# Patient Record
Sex: FEMALE | Race: WHITE | NOT HISPANIC OR LATINO | URBAN - METROPOLITAN AREA
[De-identification: names, ages, dates, MRNs, and addresses within clinical notes are randomized per-mention and may not be internally consistent; named-entity substitution may affect disease eponyms.]

---

## 2024-02-07 ENCOUNTER — APPOINTMENT (OUTPATIENT)
Dept: RADIOLOGY | Facility: CLINIC | Age: 49
End: 2024-02-07
Payer: COMMERCIAL

## 2024-02-07 ENCOUNTER — OFFICE VISIT (OUTPATIENT)
Age: 49
End: 2024-02-07
Payer: COMMERCIAL

## 2024-02-07 VITALS
HEIGHT: 62 IN | RESPIRATION RATE: 16 BRPM | SYSTOLIC BLOOD PRESSURE: 134 MMHG | BODY MASS INDEX: 27.05 KG/M2 | WEIGHT: 147 LBS | DIASTOLIC BLOOD PRESSURE: 87 MMHG

## 2024-02-07 DIAGNOSIS — M20.22 HALLUX RIGIDUS OF BOTH FEET: Primary | ICD-10-CM

## 2024-02-07 DIAGNOSIS — M21.41 ACQUIRED FLAT FOOT, RIGHT: ICD-10-CM

## 2024-02-07 DIAGNOSIS — M25.571 ARTHRALGIA OF RIGHT FOOT: ICD-10-CM

## 2024-02-07 DIAGNOSIS — M21.42 ACQUIRED FLAT FOOT, LEFT: ICD-10-CM

## 2024-02-07 DIAGNOSIS — M20.21 HALLUX RIGIDUS OF BOTH FEET: Primary | ICD-10-CM

## 2024-02-07 PROCEDURE — 73630 X-RAY EXAM OF FOOT: CPT

## 2024-02-07 PROCEDURE — 99203 OFFICE O/P NEW LOW 30 MIN: CPT | Performed by: PODIATRIST

## 2024-02-07 RX ORDER — SUMATRIPTAN 50 MG/1
TABLET, FILM COATED ORAL
COMMUNITY
Start: 2018-02-04

## 2024-02-07 RX ORDER — MESALAMINE 1.2 G/1
TABLET, DELAYED RELEASE ORAL
COMMUNITY
Start: 2020-03-04

## 2024-02-07 RX ORDER — SENNOSIDES 8.6 MG
CAPSULE ORAL
COMMUNITY
Start: 2023-12-21

## 2024-02-07 NOTE — PROGRESS NOTES
Assessment/Plan: Pain upon ambulation secondary to hallux rigidus right greater than left.  Acquired deformity foot.  Acquired pes planus.  Arthralgia right first MPJ.    Plan.  Chart reviewed.  Patient examined.  Patient advised on condition.  At this time we recommend arthrocentesis.  Patient will consider.  Patient would benefit from orthotic foot control.  If needed been casted for these.  These will control pronation and ease pain.  Patient is interested in surgical intervention, therefore x-rays ordered.  Patient return for evaluation and possible surgery scheduling.  Patient will take Advil or Aleve as needed.  Aftercare instruction given.  Return for follow-up.         Diagnoses and all orders for this visit:    Hallux rigidus of both feet  -     Device Prior Authorization; Future    Acquired flat foot, right  -     Device Prior Authorization; Future    Acquired flat foot, left  -     Device Prior Authorization; Future    Arthralgia of right foot  -     X-ray foot right 3+ views; Future    Other orders  -     SUMAtriptan (IMITREX) 50 mg tablet  -     mesalamine (LIALDA) 1.2 g EC tablet  -     acetaminophen (Tylenol 8 Hour Arthritis Pain) 650 mg CR tablet          Subjective: Patient has pain.  She has chronic pain in the right big toe joint.  This has been ongoing for years.  No history of trauma    No Known Allergies      Current Outpatient Medications:     acetaminophen (Tylenol 8 Hour Arthritis Pain) 650 mg CR tablet, , Disp: , Rfl:     mesalamine (LIALDA) 1.2 g EC tablet, , Disp: , Rfl:     SUMAtriptan (IMITREX) 50 mg tablet, , Disp: , Rfl:     There is no problem list on file for this patient.         Patient ID: Heydi Villela is a 48 y.o. female.    HPI    The following portions of the patient's history were reviewed and updated as appropriate:     family history includes Cancer in her mother and paternal grandmother; Diabetes in her father; Heart disease in her father and paternal grandfather;  Hypertension in her mother; Ulcerative colitis in her sister.      reports that she has never smoked. She has never used smokeless tobacco. She reports current alcohol use of about 2.0 standard drinks of alcohol per week. She reports that she does not use drugs.    Vitals:    02/07/24 1314   BP: 134/87   Resp: 16       Review of Systems      Objective:  Patient's shoes and socks removed.   Foot Exam    General  General Appearance: appears stated age and healthy   Orientation: alert and oriented to person, place, and time   Affect: appropriate       Right Foot/Ankle     Inspection and Palpation  Tenderness: great toe metatarsophalangeal joint   Swelling: great toe metatarsophalangeal joint   Arch: pes planus  Hallux limitus: yes    Neurovascular  Dorsalis pedis: 3+  Posterior tibial: 3+      Left Foot/Ankle      Inspection and Palpation  Arch: pes planus  Hallux limitus: yes    Neurovascular  Dorsalis pedis: 3+  Posterior tibial: 3+      Physical Exam  Vitals and nursing note reviewed.   Constitutional:       Appearance: Normal appearance.   Cardiovascular:      Rate and Rhythm: Normal rate and regular rhythm.      Pulses:           Dorsalis pedis pulses are 3+ on the right side and 3+ on the left side.        Posterior tibial pulses are 3+ on the right side and 3+ on the left side.   Feet:      Comments: Patient is pronated in stance and gait.  Right foot demonstrates significant dorsal spurring.  Decreased range of motion of right first MPJ noted.  Mild crepitus noted.  Skin:     Capillary Refill: Capillary refill takes less than 2 seconds.   Neurological:      Mental Status: She is alert.   Psychiatric:         Mood and Affect: Mood normal.         Behavior: Behavior normal.         Thought Content: Thought content normal.         Judgment: Judgment normal.

## 2024-02-08 ENCOUNTER — TELEPHONE (OUTPATIENT)
Age: 49
End: 2024-02-08

## 2024-02-08 NOTE — TELEPHONE ENCOUNTER
Lmom for patient per dr de la cruz test results are    Please advise patient as expected there is arthritis of the big toe joint

## 2024-02-08 NOTE — TELEPHONE ENCOUNTER
----- Message from Mauri Klein DPM sent at 2/7/2024  5:14 PM EST -----  Please advise patient as expected there is arthritis of the big toe joint  ----- Message -----  From: Samaria, Radiology Results In  Sent: 2/7/2024   4:24 PM EST  To: Mauri Klein DPM

## 2024-02-14 ENCOUNTER — TELEPHONE (OUTPATIENT)
Age: 49
End: 2024-02-14

## 2024-02-14 NOTE — TELEPHONE ENCOUNTER
LMOM for patient to callback to go over information from her insurance company about her orthotics the info is in the referral entry.

## 2024-02-16 ENCOUNTER — TELEPHONE (OUTPATIENT)
Age: 49
End: 2024-02-16

## 2024-02-16 NOTE — TELEPHONE ENCOUNTER
Caller: Heydi     Doctor/Office: Ernie/urvashi     CB#: [phone]    Escalation: Care Please call pt back about orthotics self pay vs ins

## 2024-02-19 NOTE — TELEPHONE ENCOUNTER
Went over information with her. She stated that she typically doesn't have a deduct so I am going to call her insur again tomorrow to make sure.

## 2024-02-20 ENCOUNTER — TELEPHONE (OUTPATIENT)
Age: 49
End: 2024-02-20

## 2024-02-20 NOTE — TELEPHONE ENCOUNTER
Caller: Heydi Villela    Doctor: Ernie Villatoro    Reason for call: Heydi is returning office call to her regarding orthotics.  Can someone please reach back out to her?  Thank you.    Call back#: [phone]

## 2024-03-05 ENCOUNTER — OFFICE VISIT (OUTPATIENT)
Age: 49
End: 2024-03-05
Payer: COMMERCIAL

## 2024-03-05 VITALS
BODY MASS INDEX: 27.05 KG/M2 | WEIGHT: 147 LBS | SYSTOLIC BLOOD PRESSURE: 110 MMHG | HEIGHT: 62 IN | HEART RATE: 88 BPM | DIASTOLIC BLOOD PRESSURE: 75 MMHG | RESPIRATION RATE: 17 BRPM

## 2024-03-05 DIAGNOSIS — M21.42 ACQUIRED FLAT FOOT, LEFT: ICD-10-CM

## 2024-03-05 DIAGNOSIS — M20.21 HALLUX RIGIDUS OF RIGHT FOOT: ICD-10-CM

## 2024-03-05 DIAGNOSIS — M21.41 ACQUIRED FLAT FOOT, RIGHT: ICD-10-CM

## 2024-03-05 DIAGNOSIS — M25.571 ARTHRALGIA OF RIGHT FOOT: Primary | ICD-10-CM

## 2024-03-05 PROCEDURE — 20600 DRAIN/INJ JOINT/BURSA W/O US: CPT | Performed by: PODIATRIST

## 2024-03-05 PROCEDURE — 99212 OFFICE O/P EST SF 10 MIN: CPT | Performed by: PODIATRIST

## 2024-03-05 RX ORDER — MELOXICAM 7.5 MG/1
7.5 TABLET ORAL DAILY
Qty: 10 TABLET | Refills: 0 | Status: SHIPPED | OUTPATIENT
Start: 2024-03-05 | End: 2024-03-15

## 2024-03-05 NOTE — PROGRESS NOTES
"Assessment/Plan: Hallux rigidus right greater than left.  Acquired deformity foot.  Acquired pes planus.  Pain upon ambulation.    Plan.  Chart reviewed.  PCP notes reviewed.  Patient examined.  Patient advised on condition.  Today arthrocentesis done right first MPJ.  Patient be placed on Mobic.  She will use orthotics as directed.  Patient advised on the possibility of operative intervention for the right foot.  We will consider cheilectomy.  Aftercare instruction given.  Return as needed.     Small joint arthrocentesis: R great MTP  Universal Protocol:  Consent: Verbal consent obtained.  Risks and benefits: risks, benefits and alternatives were discussed  Consent given by: patient  Time out: Immediately prior to procedure a \"time out\" was called to verify the correct patient, procedure, equipment, support staff and site/side marked as required.  Timeout called at: 3/5/2024 3:07 PM.  Patient understanding: patient states understanding of the procedure being performed  Patient identity confirmed: verbally with patient  Supporting Documentation  Indications: pain and joint swelling   Procedure Details  Location: great toe - R great MTP  Preparation: Patient was prepped and draped in the usual sterile fashion  Needle size: 25 G  Ultrasound guidance: no  Approach: dorsal  Medications administered: 10 mg triamcinolone acetonide 10 mg/mL    Patient tolerance: patient tolerated the procedure well with no immediate complications                 Diagnoses and all orders for this visit:    Arthralgia of right foot  -     meloxicam (MOBIC) 7.5 mg tablet; Take 1 tablet (7.5 mg total) by mouth daily for 10 days    Acquired flat foot, right  -     meloxicam (MOBIC) 7.5 mg tablet; Take 1 tablet (7.5 mg total) by mouth daily for 10 days    Acquired flat foot, left  -     meloxicam (MOBIC) 7.5 mg tablet; Take 1 tablet (7.5 mg total) by mouth daily for 10 days    Hallux rigidus of right foot  -     meloxicam (MOBIC) 7.5 mg tablet; " Take 1 tablet (7.5 mg total) by mouth daily for 10 days          Subjective: Has return of pain.  She has pain mostly in her right big toe joint.  No history of trauma.    No Known Allergies      Current Outpatient Medications:     acetaminophen (Tylenol 8 Hour Arthritis Pain) 650 mg CR tablet, , Disp: , Rfl:     meloxicam (MOBIC) 7.5 mg tablet, Take 1 tablet (7.5 mg total) by mouth daily for 10 days, Disp: 10 tablet, Rfl: 0    mesalamine (LIALDA) 1.2 g EC tablet, , Disp: , Rfl:     SUMAtriptan (IMITREX) 50 mg tablet, , Disp: , Rfl:     There is no problem list on file for this patient.         Patient ID: Heydi Villela is a 48 y.o. female.    HPI    The following portions of the patient's history were reviewed and updated as appropriate:     family history includes Cancer in her mother and paternal grandmother; Diabetes in her father; Heart disease in her father and paternal grandfather; Hypertension in her mother; Ulcerative colitis in her sister.      reports that she has never smoked. She has never used smokeless tobacco. She reports current alcohol use of about 2.0 standard drinks of alcohol per week. She reports that she does not use drugs.    Vitals:    03/05/24 1455   BP: 110/75   Pulse: 88   Resp: 17       Review of Systems      Objective:  Patient's shoes and socks removed.   Foot ExamPhysical Exam         Foot Exam     General  General Appearance: appears stated age and healthy   Orientation: alert and oriented to person, place, and time   Affect: appropriate         Right Foot/Ankle      Inspection and Palpation  Tenderness: great toe metatarsophalangeal joint   Swelling: great toe metatarsophalangeal joint   Arch: pes planus  Hallux limitus: yes     Neurovascular  Dorsalis pedis: 3+  Posterior tibial: 3+        Left Foot/Ankle       Inspection and Palpation  Arch: pes planus  Hallux limitus: yes     Neurovascular  Dorsalis pedis: 3+  Posterior tibial: 3+        Physical Exam  Vitals and nursing note  reviewed.   Constitutional:       Appearance: Normal appearance.   Cardiovascular:      Rate and Rhythm: Normal rate and regular rhythm.      Pulses:           Dorsalis pedis pulses are 3+ on the right side and 3+ on the left side.        Posterior tibial pulses are 3+ on the right side and 3+ on the left side.   Feet:      Comments: Patient is pronated in stance and gait.  Right foot demonstrates significant dorsal spurring.  Decreased range of motion of right first MPJ noted.  Mild crepitus noted.  Skin:     Capillary Refill: Capillary refill takes less than 2 seconds.   Neurological:      Mental Status: She is alert.   Psychiatric:         Mood and Affect: Mood normal.         Behavior: Behavior normal.         Thought Content: Thought content normal.         Judgment: Judgment normal.

## 2024-03-26 ENCOUNTER — OFFICE VISIT (OUTPATIENT)
Age: 49
End: 2024-03-26
Payer: COMMERCIAL

## 2024-03-26 DIAGNOSIS — M21.41 ACQUIRED FLAT FOOT, RIGHT: ICD-10-CM

## 2024-03-26 DIAGNOSIS — M21.42 ACQUIRED FLAT FOOT, LEFT: ICD-10-CM

## 2024-03-26 DIAGNOSIS — M20.22 HALLUX RIGIDUS OF BOTH FEET: Primary | ICD-10-CM

## 2024-03-26 DIAGNOSIS — M20.21 HALLUX RIGIDUS OF BOTH FEET: Primary | ICD-10-CM

## 2024-03-26 PROCEDURE — L3000 FT INSERT UCB BERKELEY SHELL: HCPCS | Performed by: PODIATRIST

## 2024-03-26 NOTE — PROGRESS NOTES
Assessment/Plan: Arthralgia secondary to hallux rigidus deformity bilateral.  Acquired deformity foot bilateral.  Acquired pes planus.  Pain.    Plan.  Chart reviewed.  Patient presents for orthotic pickup.  Today patient was dispensed orthotics.  She will use these daily to control deformity and ease pain.         Diagnoses and all orders for this visit:    Hallux rigidus of both feet    Acquired flat foot, right    Acquired flat foot, left          Subjective: Patient has pain upon ambulation.    No Known Allergies      Current Outpatient Medications:     acetaminophen (Tylenol 8 Hour Arthritis Pain) 650 mg CR tablet, , Disp: , Rfl:     meloxicam (MOBIC) 7.5 mg tablet, Take 1 tablet (7.5 mg total) by mouth daily for 10 days, Disp: 10 tablet, Rfl: 0    mesalamine (LIALDA) 1.2 g EC tablet, , Disp: , Rfl:     SUMAtriptan (IMITREX) 50 mg tablet, , Disp: , Rfl:     Current Facility-Administered Medications:     triamcinolone acetonide (KENALOG-10) 10 mg/mL injection 10 mg, 10 mg, Intra-articular, , , 10 mg at 03/05/24 9875    There is no problem list on file for this patient.         Patient ID: Heydi Villela is a 48 y.o. female.    HPI    The following portions of the patient's history were reviewed and updated as appropriate:     family history includes Cancer in her mother and paternal grandmother; Diabetes in her father; Heart disease in her father and paternal grandfather; Hypertension in her mother; Ulcerative colitis in her sister.      reports that she has never smoked. She has never used smokeless tobacco. She reports current alcohol use of about 2.0 standard drinks of alcohol per week. She reports that she does not use drugs.    There were no vitals filed for this visit.    Review of Systems      Objective:  Patient's shoes and socks removed.   Foot ExamPhysical Exam         Foot Exam     General  General Appearance: appears stated age and healthy   Orientation: alert and oriented to person, place, and time    Affect: appropriate         Right Foot/Ankle      Inspection and Palpation  Tenderness: great toe metatarsophalangeal joint   Swelling: great toe metatarsophalangeal joint   Arch: pes planus  Hallux limitus: yes     Neurovascular  Dorsalis pedis: 3+  Posterior tibial: 3+        Left Foot/Ankle       Inspection and Palpation  Arch: pes planus  Hallux limitus: yes     Neurovascular  Dorsalis pedis: 3+  Posterior tibial: 3+        Physical Exam  Vitals and nursing note reviewed.   Constitutional:       Appearance: Normal appearance.   Cardiovascular:      Rate and Rhythm: Normal rate and regular rhythm.      Pulses:           Dorsalis pedis pulses are 3+ on the right side and 3+ on the left side.        Posterior tibial pulses are 3+ on the right side and 3+ on the left side.   Feet:      Comments: Patient is pronated in stance and gait.  Right foot demonstrates significant dorsal spurring.  Decreased range of motion of right first MPJ noted.  Mild crepitus noted.  Skin:     Capillary Refill: Capillary refill takes less than 2 seconds.   Neurological:      Mental Status: She is alert.   Psychiatric:         Mood and Affect: Mood normal.         Behavior: Behavior normal.         Thought Content: Thought content normal.         Judgment: Judgment normal.

## 2024-10-23 ENCOUNTER — APPOINTMENT (OUTPATIENT)
Dept: RADIOLOGY | Facility: CLINIC | Age: 49
End: 2024-10-23
Payer: COMMERCIAL

## 2024-10-23 ENCOUNTER — OFFICE VISIT (OUTPATIENT)
Age: 49
End: 2024-10-23
Payer: COMMERCIAL

## 2024-10-23 VITALS
BODY MASS INDEX: 27.05 KG/M2 | HEIGHT: 62 IN | WEIGHT: 147 LBS | DIASTOLIC BLOOD PRESSURE: 72 MMHG | RESPIRATION RATE: 17 BRPM | HEART RATE: 66 BPM | SYSTOLIC BLOOD PRESSURE: 107 MMHG

## 2024-10-23 DIAGNOSIS — M21.961 ACQUIRED DEFORMITY OF RIGHT FOOT: ICD-10-CM

## 2024-10-23 DIAGNOSIS — M20.21 HALLUX RIGIDUS OF RIGHT FOOT: ICD-10-CM

## 2024-10-23 DIAGNOSIS — M21.42 ACQUIRED FLAT FOOT, LEFT: ICD-10-CM

## 2024-10-23 DIAGNOSIS — M25.571 ARTHRALGIA OF RIGHT FOOT: ICD-10-CM

## 2024-10-23 DIAGNOSIS — M21.41 ACQUIRED FLAT FOOT, RIGHT: ICD-10-CM

## 2024-10-23 DIAGNOSIS — M25.571 ARTHRALGIA OF RIGHT FOOT: Primary | ICD-10-CM

## 2024-10-23 PROCEDURE — 20600 DRAIN/INJ JOINT/BURSA W/O US: CPT | Performed by: PODIATRIST

## 2024-10-23 PROCEDURE — 99212 OFFICE O/P EST SF 10 MIN: CPT | Performed by: PODIATRIST

## 2024-10-23 PROCEDURE — 73630 X-RAY EXAM OF FOOT: CPT

## 2024-10-23 RX ORDER — MELOXICAM 7.5 MG/1
7.5 TABLET ORAL DAILY
Qty: 20 TABLET | Refills: 0 | Status: SHIPPED | OUTPATIENT
Start: 2024-10-23 | End: 2024-11-12

## 2024-10-23 NOTE — PROGRESS NOTES
"Assessment/Plan: Pain from arthralgia right first MPJ.  Hallux rigidus right greater than left.  Acquire deformity foot bilateral.  Acquired pes planus.    Plan.  Chart reviewed.  PCP notes reviewed.  Patient evaluated.  Today arthrocentesis right first MPJ performed.  In addition we will add Mobic.  Patient use orthotics daily.  X-rays ordered to rule out extent of arthritic change.  Patient is contemplating surgery.  Will plan for cheilectomy right foot.    Small joint arthrocentesis: R great MTP  Universal Protocol:  procedure performed by consultantConsent: Verbal consent obtained. Written consent not obtained.  Risks and benefits: risks, benefits and alternatives were discussed  Consent given by: patient  Time out: Immediately prior to procedure a \"time out\" was called to verify the correct patient, procedure, equipment, support staff and site/side marked as required.  Timeout called at: 10/23/2024 9:05 AM.  Patient understanding: patient states understanding of the procedure being performed  Patient identity confirmed: verbally with patient  Supporting Documentation  Indications: pain and joint swelling   Procedure Details  Location: great toe - R great MTP  Ultrasound guidance: no  Approach: dorsal  Medications administered: 10 mg triamcinolone acetonide 10 mg/mL    Patient tolerance: patient tolerated the procedure well with no immediate complications  Dressing:  Sterile dressing applied                 Diagnoses and all orders for this visit:    Arthralgia of right foot  -     XR foot 3+ vw right; Future  -     meloxicam (MOBIC) 7.5 mg tablet; Take 1 tablet (7.5 mg total) by mouth daily for 20 days    Acquired deformity of right foot  -     XR foot 3+ vw right; Future    Acquired flat foot, right    Acquired flat foot, left    Hallux rigidus of right foot  -     XR foot 3+ vw right; Future  -     meloxicam (MOBIC) 7.5 mg tablet; Take 1 tablet (7.5 mg total) by mouth daily for 20 days          Subjective: " Patient is having increasing pain of the right foot.  This is the big toe joint.  No history of trauma.  Patient is using orthotics.  She is inquiring about surgery.    No Known Allergies      Current Outpatient Medications:     acetaminophen (Tylenol 8 Hour Arthritis Pain) 650 mg CR tablet, , Disp: , Rfl:     meloxicam (MOBIC) 7.5 mg tablet, Take 1 tablet (7.5 mg total) by mouth daily for 20 days, Disp: 20 tablet, Rfl: 0    mesalamine (LIALDA) 1.2 g EC tablet, , Disp: , Rfl:     SUMAtriptan (IMITREX) 50 mg tablet, , Disp: , Rfl:     meloxicam (MOBIC) 7.5 mg tablet, Take 1 tablet (7.5 mg total) by mouth daily for 10 days, Disp: 10 tablet, Rfl: 0    Current Facility-Administered Medications:     triamcinolone acetonide (KENALOG-10) 10 mg/mL injection 10 mg, 10 mg, Intra-articular, , , 10 mg at 03/05/24 1488    There is no problem list on file for this patient.         Patient ID: Heydi Villela is a 49 y.o. female.    HPI    The following portions of the patient's history were reviewed and updated as appropriate:     family history includes Cancer in her mother and paternal grandmother; Diabetes in her father; Heart disease in her father and paternal grandfather; Hypertension in her mother; Ulcerative colitis in her sister.      reports that she has never smoked. She has never used smokeless tobacco. She reports current alcohol use of about 2.0 standard drinks of alcohol per week. She reports that she does not use drugs.    Vitals:    10/23/24 0854   BP: 107/72   Pulse: 66   Resp: 17       Review of Systems      Objective:  Patient's shoes and socks removed.   Foot ExamPhysical Exam        General  General Appearance: appears stated age and healthy   Orientation: alert and oriented to person, place, and time   Affect: appropriate         Right Foot/Ankle      Inspection and Palpation  Tenderness: great toe metatarsophalangeal joint   Swelling: great toe metatarsophalangeal joint   Arch: pes planus  Hallux limitus: yes      Neurovascular  Dorsalis pedis: 3+  Posterior tibial: 3+        Left Foot/Ankle       Inspection and Palpation  Arch: pes planus  Hallux limitus: yes     Neurovascular  Dorsalis pedis: 3+  Posterior tibial: 3+        Physical Exam  Vitals and nursing note reviewed.   Constitutional:       Appearance: Normal appearance.   Cardiovascular:      Rate and Rhythm: Normal rate and regular rhythm.      Pulses:           Dorsalis pedis pulses are 3+ on the right side and 3+ on the left side.        Posterior tibial pulses are 3+ on the right side and 3+ on the left side.   Feet:      Comments: Patient is pronated in stance and gait.  Right foot demonstrates significant dorsal spurring.  Decreased range of motion of right first MPJ noted.  Mild crepitus noted.  Skin:     Capillary Refill: Capillary refill takes less than 2 seconds.   Neurological:      Mental Status: She is alert.   Psychiatric:         Mood and Affect: Mood normal.         Behavior: Behavior normal.         Thought Content: Thought content normal.         Judgment: Judgment normal.

## 2024-11-19 ENCOUNTER — OFFICE VISIT (OUTPATIENT)
Age: 49
End: 2024-11-19
Payer: COMMERCIAL

## 2024-11-19 VITALS
RESPIRATION RATE: 17 BRPM | SYSTOLIC BLOOD PRESSURE: 111 MMHG | WEIGHT: 147 LBS | HEART RATE: 73 BPM | DIASTOLIC BLOOD PRESSURE: 79 MMHG | BODY MASS INDEX: 27.05 KG/M2 | HEIGHT: 62 IN

## 2024-11-19 DIAGNOSIS — M20.21 HALLUX RIGIDUS OF RIGHT FOOT: ICD-10-CM

## 2024-11-19 DIAGNOSIS — M79.671 RIGHT FOOT PAIN: ICD-10-CM

## 2024-11-19 DIAGNOSIS — M25.571 ARTHRALGIA OF RIGHT FOOT: Primary | ICD-10-CM

## 2024-11-19 DIAGNOSIS — M21.961 ACQUIRED DEFORMITY OF RIGHT FOOT: ICD-10-CM

## 2024-11-19 PROCEDURE — 20600 DRAIN/INJ JOINT/BURSA W/O US: CPT | Performed by: PODIATRIST

## 2024-11-19 PROCEDURE — 99213 OFFICE O/P EST LOW 20 MIN: CPT | Performed by: PODIATRIST

## 2024-11-19 RX ORDER — OXYCODONE AND ACETAMINOPHEN 5; 325 MG/1; MG/1
1 TABLET ORAL EVERY 8 HOURS PRN
Qty: 15 TABLET | Refills: 0 | Status: SHIPPED | OUTPATIENT
Start: 2024-11-19 | End: 2024-11-24

## 2024-11-19 RX ORDER — CHLORHEXIDINE GLUCONATE ORAL RINSE 1.2 MG/ML
15 SOLUTION DENTAL ONCE
OUTPATIENT
Start: 2024-11-19 | End: 2024-11-19

## 2024-11-19 RX ORDER — HYDROXYZINE HYDROCHLORIDE 25 MG/1
25 TABLET, FILM COATED ORAL 3 TIMES DAILY
Qty: 9 TABLET | Refills: 0 | Status: SHIPPED | OUTPATIENT
Start: 2024-11-19 | End: 2024-11-22

## 2024-11-19 RX ORDER — CLINDAMYCIN HYDROCHLORIDE 300 MG/1
300 CAPSULE ORAL 4 TIMES DAILY
Qty: 40 CAPSULE | Refills: 0 | Status: SHIPPED | OUTPATIENT
Start: 2024-11-19 | End: 2024-11-29

## 2024-11-19 RX ORDER — MELOXICAM 15 MG/1
15 TABLET ORAL DAILY
Qty: 30 TABLET | Refills: 0 | Status: SHIPPED | OUTPATIENT
Start: 2024-11-19 | End: 2024-12-19

## 2024-11-19 NOTE — PROGRESS NOTES
"Assessment/Plan: Pain from arthralgia right first MPJ.  Grade 1 hallux rigidus deformity right first MPJ.  Acquired deformity foot.    Plan.  Chart reviewed.  PCP notes reviewed.  Patient evaluated.  At this time patient is asking about surgical intervention.  She is tired of the pain.  We will plan for cheilectomy of the right foot.  Patient is aware of this.  She has been advised on procedure and all the possible risks benefits alternatives and complications understanding no guarantees are being given.  Patient is signed consent form for surgery.  In addition, today we have placed the patient on Mobic.  In addition we have done arthrodesis right first MPJ.  Present for procedure.    Small joint arthrocentesis: R great MTP  Universal Protocol:  procedure performed by consultantConsent: Verbal consent obtained. Written consent not obtained.  Risks and benefits: risks, benefits and alternatives were discussed  Consent given by: patient  Time out: Immediately prior to procedure a \"time out\" was called to verify the correct patient, procedure, equipment, support staff and site/side marked as required.  Timeout called at: 11/19/2024 1:12 PM.  Patient understanding: patient states understanding of the procedure being performed  Patient identity confirmed: verbally with patient  Supporting Documentation  Indications: pain and joint swelling   Procedure Details  Location: great toe - R great MTP  Ultrasound guidance: no  Approach: dorsal  Medications administered: 10 mg triamcinolone acetonide 10 mg/mL    Patient tolerance: patient tolerated the procedure well with no immediate complications  Dressing:  Sterile dressing applied                 Diagnoses and all orders for this visit:    Arthralgia of right foot  -     meloxicam (MOBIC) 15 mg tablet; Take 1 tablet (15 mg total) by mouth daily  -     Case request operating room: CHEILECTOMY WITHOUT IMPLANT; Standing  -     Ambulatory referral to Family Practice; Future  -    "  CBC and differential; Future  -     Basic metabolic panel; Future  -     hCG, quantitative; Future  -     Case request operating room: CHEILECTOMY WITHOUT IMPLANT  -     Post Op Shoe    Acquired deformity of right foot  -     meloxicam (MOBIC) 15 mg tablet; Take 1 tablet (15 mg total) by mouth daily  -     Case request operating room: CHEILECTOMY WITHOUT IMPLANT; Standing  -     Ambulatory referral to Family Practice; Future  -     CBC and differential; Future  -     Basic metabolic panel; Future  -     hCG, quantitative; Future  -     Case request operating room: CHEILECTOMY WITHOUT IMPLANT  -     Post Op Shoe    Hallux rigidus of right foot  -     meloxicam (MOBIC) 15 mg tablet; Take 1 tablet (15 mg total) by mouth daily  -     Case request operating room: CHEILECTOMY WITHOUT IMPLANT; Standing  -     Ambulatory referral to Family Practice; Future  -     CBC and differential; Future  -     Basic metabolic panel; Future  -     hCG, quantitative; Future  -     Case request operating room: CHEILECTOMY WITHOUT IMPLANT  -     Post Op Shoe    Right foot pain  -     meloxicam (MOBIC) 15 mg tablet; Take 1 tablet (15 mg total) by mouth daily  -     Case request operating room: CHEILECTOMY WITHOUT IMPLANT; Standing  -     Ambulatory referral to Family Practice; Future  -     CBC and differential; Future  -     Basic metabolic panel; Future  -     hCG, quantitative; Future  -     Case request operating room: CHEILECTOMY WITHOUT IMPLANT  -     Post Op Shoe    Other orders  -     Nursing Communication Warmimg Interventions Implemented; Standing  -     Nursing Communication CHG bath, have staff wash entire body (neck down) per pre-op bathing protocol. Routine, evening prior to, and day of surgery.; Standing  -     Nursing Communication Swab both nares with Povidone-Iodine solution, EXCLUDE if patient has shellfish/Iodine allergy, and replace with nasal alcohol swabstick. Routine, day of surgery, on call to OR; Standing  -      chlorhexidine (PERIDEX) 0.12 % oral rinse 15 mL  -     Void on call to OR; Standing  -     Insert peripheral IV; Standing  -     Diet NPO; Sips with meds; Standing  -     Height and weight upon arrival; Standing          Subjective: Patient is continued ongoing pain in the right foot.  She is getting minimal relief from injection therapy.  She is asking about surgical intervention to help alleviate her complaint of pain.    No Known Allergies      Current Outpatient Medications:     acetaminophen (Tylenol 8 Hour Arthritis Pain) 650 mg CR tablet, , Disp: , Rfl:     meloxicam (MOBIC) 15 mg tablet, Take 1 tablet (15 mg total) by mouth daily, Disp: 30 tablet, Rfl: 0    mesalamine (LIALDA) 1.2 g EC tablet, , Disp: , Rfl:     SUMAtriptan (IMITREX) 50 mg tablet, , Disp: , Rfl:     meloxicam (MOBIC) 7.5 mg tablet, Take 1 tablet (7.5 mg total) by mouth daily for 10 days, Disp: 10 tablet, Rfl: 0    meloxicam (MOBIC) 7.5 mg tablet, Take 1 tablet (7.5 mg total) by mouth daily for 20 days, Disp: 20 tablet, Rfl: 0    Current Facility-Administered Medications:     triamcinolone acetonide (KENALOG-10) 10 mg/mL injection 10 mg, 10 mg, Intra-articular, , , 10 mg at 03/05/24 1515    triamcinolone acetonide (KENALOG-10) 10 mg/mL injection 10 mg, 10 mg, Intra-articular, , , 10 mg at 10/23/24 0900    Patient Active Problem List   Diagnosis    Right foot pain    Arthralgia of right foot    Hallux rigidus of right foot    Acquired deformity of right foot          Patient ID: Heydi Villela is a 49 y.o. female.    HPI    The following portions of the patient's history were reviewed and updated as appropriate:     family history includes Cancer in her mother and paternal grandmother; Diabetes in her father; Heart disease in her father and paternal grandfather; Hypertension in her mother; Ulcerative colitis in her sister.      reports that she has never smoked. She has never used smokeless tobacco. She reports current alcohol use of about 2.0  standard drinks of alcohol per week. She reports that she does not use drugs.    Vitals:    11/19/24 1154   BP: 111/79   Pulse: 73   Resp: 17       Review of Systems      Objective:  Patient's shoes and socks removed.   Foot ExamPhysical Exam         General  General Appearance: appears stated age and healthy   Orientation: alert and oriented to person, place, and time   Affect: appropriate         Right Foot/Ankle      Inspection and Palpation  Tenderness: great toe metatarsophalangeal joint   Swelling: great toe metatarsophalangeal joint   Arch: pes planus  Hallux limitus: yes     Neurovascular  Dorsalis pedis: 3+  Posterior tibial: 3+        Left Foot/Ankle       Inspection and Palpation  Arch: pes planus  Hallux limitus: yes     Neurovascular  Dorsalis pedis: 3+  Posterior tibial: 3+        Physical Exam  Vitals and nursing note reviewed.   Constitutional:       Appearance: Normal appearance.   Cardiovascular:      Rate and Rhythm: Normal rate and regular rhythm.      Pulses:           Dorsalis pedis pulses are 3+ on the right side and 3+ on the left side.        Posterior tibial pulses are 3+ on the right side and 3+ on the left side.   Feet:      Comments: Patient is pronated in stance and gait.  Right foot demonstrates significant dorsal spurring.  Decreased range of motion of right first MPJ noted.  Mild crepitus noted.  Skin:     Capillary Refill: Capillary refill takes less than 2 seconds.   Neurological:      Mental Status: She is alert.   Psychiatric:         Mood and Affect: Mood normal.         Behavior: Behavior normal.         Thought Content: Thought content normal.         Judgment: Judgment normal.

## 2024-11-21 ENCOUNTER — TELEPHONE (OUTPATIENT)
Age: 49
End: 2024-11-21

## 2024-11-21 NOTE — TELEPHONE ENCOUNTER
Caller: Heydi     Doctor and/or Office: DR Klein     #: [phone]     Escalation: Surgery Patient is concerned that no one reached out to her about a sx date.

## 2025-01-03 ENCOUNTER — TELEPHONE (OUTPATIENT)
Age: 50
End: 2025-01-03

## 2025-01-03 ENCOUNTER — APPOINTMENT (OUTPATIENT)
Dept: LAB | Facility: HOSPITAL | Age: 50
End: 2025-01-03
Attending: PODIATRIST
Payer: COMMERCIAL

## 2025-01-03 DIAGNOSIS — M20.21 HALLUX RIGIDUS OF RIGHT FOOT: ICD-10-CM

## 2025-01-03 DIAGNOSIS — M25.571 ARTHRALGIA OF RIGHT FOOT: ICD-10-CM

## 2025-01-03 DIAGNOSIS — M21.961 ACQUIRED DEFORMITY OF RIGHT FOOT: ICD-10-CM

## 2025-01-03 DIAGNOSIS — M79.671 RIGHT FOOT PAIN: ICD-10-CM

## 2025-01-03 LAB
ANION GAP SERPL CALCULATED.3IONS-SCNC: 3 MMOL/L (ref 4–13)
B-HCG SERPL-ACNC: <0.6 MIU/ML (ref 0–5)
BASOPHILS # BLD AUTO: 0.02 THOUSANDS/ΜL (ref 0–0.1)
BASOPHILS NFR BLD AUTO: 0 % (ref 0–1)
BUN SERPL-MCNC: 10 MG/DL (ref 5–25)
CALCIUM SERPL-MCNC: 8.8 MG/DL (ref 8.4–10.2)
CHLORIDE SERPL-SCNC: 106 MMOL/L (ref 96–108)
CO2 SERPL-SCNC: 27 MMOL/L (ref 21–32)
CREAT SERPL-MCNC: 0.7 MG/DL (ref 0.6–1.3)
EOSINOPHIL # BLD AUTO: 0.11 THOUSAND/ΜL (ref 0–0.61)
EOSINOPHIL NFR BLD AUTO: 2 % (ref 0–6)
ERYTHROCYTE [DISTWIDTH] IN BLOOD BY AUTOMATED COUNT: 12.3 % (ref 11.6–15.1)
GFR SERPL CREATININE-BSD FRML MDRD: 102 ML/MIN/1.73SQ M
GLUCOSE P FAST SERPL-MCNC: 100 MG/DL (ref 65–99)
HCT VFR BLD AUTO: 40.8 % (ref 34.8–46.1)
HGB BLD-MCNC: 13.2 G/DL (ref 11.5–15.4)
IMM GRANULOCYTES # BLD AUTO: 0.03 THOUSAND/UL (ref 0–0.2)
IMM GRANULOCYTES NFR BLD AUTO: 0 % (ref 0–2)
LYMPHOCYTES # BLD AUTO: 2.15 THOUSANDS/ΜL (ref 0.6–4.47)
LYMPHOCYTES NFR BLD AUTO: 31 % (ref 14–44)
MCH RBC QN AUTO: 29.4 PG (ref 26.8–34.3)
MCHC RBC AUTO-ENTMCNC: 32.4 G/DL (ref 31.4–37.4)
MCV RBC AUTO: 91 FL (ref 82–98)
MONOCYTES # BLD AUTO: 0.59 THOUSAND/ΜL (ref 0.17–1.22)
MONOCYTES NFR BLD AUTO: 9 % (ref 4–12)
NEUTROPHILS # BLD AUTO: 3.95 THOUSANDS/ΜL (ref 1.85–7.62)
NEUTS SEG NFR BLD AUTO: 58 % (ref 43–75)
NRBC BLD AUTO-RTO: 0 /100 WBCS
PLATELET # BLD AUTO: 301 THOUSANDS/UL (ref 149–390)
PMV BLD AUTO: 8.7 FL (ref 8.9–12.7)
POTASSIUM SERPL-SCNC: 4 MMOL/L (ref 3.5–5.3)
RBC # BLD AUTO: 4.49 MILLION/UL (ref 3.81–5.12)
SODIUM SERPL-SCNC: 136 MMOL/L (ref 135–147)
WBC # BLD AUTO: 6.85 THOUSAND/UL (ref 4.31–10.16)

## 2025-01-03 PROCEDURE — 84702 CHORIONIC GONADOTROPIN TEST: CPT

## 2025-01-03 PROCEDURE — 80048 BASIC METABOLIC PNL TOTAL CA: CPT

## 2025-01-03 PROCEDURE — 36415 COLL VENOUS BLD VENIPUNCTURE: CPT

## 2025-01-03 PROCEDURE — 85025 COMPLETE CBC W/AUTO DIFF WBC: CPT

## 2025-01-03 NOTE — TELEPHONE ENCOUNTER
Caller: [facility]     Doctor: Ernie    Reason for call: Patient is at Outpatient ot get her BW and EKG but there is no order for her EKG- Can this please be placed in EPIC    Call back#: [phone] ([facility])

## 2025-01-03 NOTE — TELEPHONE ENCOUNTER
Spoke with in regards to message blood work and EKG are in the patient my chart she is getting both done today

## 2025-01-05 LAB
ATRIAL RATE: 69 BPM
P AXIS: 57 DEGREES
PR INTERVAL: 136 MS
QRS AXIS: 8 DEGREES
QRSD INTERVAL: 78 MS
QT INTERVAL: 398 MS
QTC INTERVAL: 427 MS
T WAVE AXIS: 28 DEGREES
VENTRICULAR RATE: 69 BPM

## 2025-01-05 PROCEDURE — 93010 ELECTROCARDIOGRAM REPORT: CPT | Performed by: INTERNAL MEDICINE

## 2025-01-13 ENCOUNTER — TELEPHONE (OUTPATIENT)
Age: 50
End: 2025-01-13

## 2025-01-13 NOTE — TELEPHONE ENCOUNTER
Caller: Kalee Espinoza    Doctor: Ernie    Reason for call: Can the surgery scheduler please give Kalee a call back regarding Heydi Manohar's surgery?  Thank you.     Call back#: [phone]

## 2025-01-13 NOTE — TELEPHONE ENCOUNTER
Spoke with kelly at PCP office , she was asking what surgery patient was having done , information was given to PCP office as well as office note faxed over for patient to have her pre-op clearance done at PCP office.

## 2025-01-27 NOTE — PRE-PROCEDURE INSTRUCTIONS
Pre-Surgery Instructions:   Medication Instructions    acetaminophen (Tylenol 8 Hour Arthritis Pain) 650 mg CR tablet Uses PRN- OK to take day of surgery    meloxicam (MOBIC) 7.5 mg tablet Stop taking 4 days prior to surgery.    mesalamine (LIALDA) 1.2 g EC tablet Take day of surgery.    SUMAtriptan (IMITREX) 50 mg tablet Uses PRN- OK to take day of surgery   Medication instructions for day surgery reviewed. Please use only a sip of water to take your instructed medications. Avoid all over the counter vitamins, supplements and NSAIDS for one week prior to surgery per anesthesia guidelines. Tylenol is ok to take as needed.     You will receive a call one business day prior to surgery with an arrival time and hospital directions. If your surgery is scheduled on a Monday, the hospital will be calling you on the Friday prior to your surgery. If you have not heard from anyone by 8pm, please call the hospital supervisor through the hospital  at [phone]. ([place] [phone] or [place] [phone]).    Do not eat or drink anything after midnight the night before your surgery, including candy, mints, lifesavers, or chewing gum. Do not drink alcohol 24hrs before your surgery. Try not to smoke at least 24hrs before your surgery.       Follow the pre surgery showering instructions as listed in the “My Surgical Experience Booklet” or otherwise provided by your surgeon's office. Do not use a blade to shave the surgical area 1 week before surgery. It is okay to use a clean electric clippers up to 24 hours before surgery. Do not apply any lotions, creams, including makeup, cologne, deodorant, or perfumes after showering on the day of your surgery. Do not use dry shampoo, hair spray, hair gel, or any type of hair products.     No contact lenses, eye make-up, or artificial eyelashes. Remove nail polish, including gel polish, and any artificial, gel, or acrylic nails if possible. Remove all jewelry including rings  and body piercing jewelry.     Wear causal clothing that is easy to take on and off. Consider your type of surgery.    Keep any valuables, jewelry, piercings at home. Please bring any specially ordered equipment (sling, braces) if indicated.    Arrange for a responsible person to drive you to and from the hospital on the day of your surgery. Please confirm the visitor policy for the day of your procedure when you receive your phone call with an arrival time.     Call the surgeon's office with any new illnesses, exposures, or additional questions prior to surgery.    Please reference your “My Surgical Experience Booklet” for additional information to prepare for your upcoming surgery.    Pt. Verbalized an understanding of all instructions reviewed and offers no concerns at this time.

## 2025-01-30 ENCOUNTER — ANESTHESIA EVENT (OUTPATIENT)
Dept: PERIOP | Facility: HOSPITAL | Age: 50
End: 2025-01-30
Payer: COMMERCIAL

## 2025-01-31 ENCOUNTER — HOSPITAL ENCOUNTER (OUTPATIENT)
Facility: HOSPITAL | Age: 50
Setting detail: OUTPATIENT SURGERY
Discharge: HOME | End: 2025-01-31
Attending: PODIATRIST | Admitting: PODIATRIST
Payer: COMMERCIAL

## 2025-01-31 ENCOUNTER — ANESTHESIA (OUTPATIENT)
Dept: PERIOP | Facility: HOSPITAL | Age: 50
End: 2025-01-31
Payer: COMMERCIAL

## 2025-01-31 VITALS
HEIGHT: 62 IN | RESPIRATION RATE: 18 BRPM | BODY MASS INDEX: 27.71 KG/M2 | WEIGHT: 150.57 LBS | OXYGEN SATURATION: 99 % | HEART RATE: 77 BPM | TEMPERATURE: 97.2 F | SYSTOLIC BLOOD PRESSURE: 108 MMHG | DIASTOLIC BLOOD PRESSURE: 66 MMHG

## 2025-01-31 DIAGNOSIS — M25.571 ARTHRALGIA OF RIGHT FOOT: ICD-10-CM

## 2025-01-31 DIAGNOSIS — M21.961 ACQUIRED DEFORMITY OF RIGHT FOOT: Primary | ICD-10-CM

## 2025-01-31 DIAGNOSIS — M20.21 HALLUX RIGIDUS OF RIGHT FOOT: ICD-10-CM

## 2025-01-31 DIAGNOSIS — M79.671 RIGHT FOOT PAIN: ICD-10-CM

## 2025-01-31 LAB
EXT PREGNANCY TEST URINE: NEGATIVE
EXT. CONTROL: NORMAL

## 2025-01-31 PROCEDURE — 81025 URINE PREGNANCY TEST: CPT | Performed by: PODIATRIST

## 2025-01-31 PROCEDURE — 28289 CORRJ HALUX RIGDUS W/O IMPLT: CPT | Performed by: PODIATRIST

## 2025-01-31 PROCEDURE — NC001 PR NO CHARGE: Performed by: STUDENT IN AN ORGANIZED HEALTH CARE EDUCATION/TRAINING PROGRAM

## 2025-01-31 PROCEDURE — NC001 PR NO CHARGE: Performed by: PODIATRIST

## 2025-01-31 PROCEDURE — C1713 ANCHOR/SCREW BN/BN,TIS/BN: HCPCS | Performed by: PODIATRIST

## 2025-01-31 RX ORDER — SODIUM CHLORIDE, SODIUM LACTATE, POTASSIUM CHLORIDE, CALCIUM CHLORIDE 600; 310; 30; 20 MG/100ML; MG/100ML; MG/100ML; MG/100ML
INJECTION, SOLUTION INTRAVENOUS CONTINUOUS PRN
Status: DISCONTINUED | OUTPATIENT
Start: 2025-01-31 | End: 2025-01-31

## 2025-01-31 RX ORDER — CEFAZOLIN SODIUM 2 G/50ML
2000 SOLUTION INTRAVENOUS ONCE
Status: COMPLETED | OUTPATIENT
Start: 2025-01-31 | End: 2025-01-31

## 2025-01-31 RX ORDER — PROPOFOL 10 MG/ML
INJECTION, EMULSION INTRAVENOUS AS NEEDED
Status: DISCONTINUED | OUTPATIENT
Start: 2025-01-31 | End: 2025-01-31

## 2025-01-31 RX ORDER — MIDAZOLAM HYDROCHLORIDE 2 MG/2ML
INJECTION, SOLUTION INTRAMUSCULAR; INTRAVENOUS AS NEEDED
Status: DISCONTINUED | OUTPATIENT
Start: 2025-01-31 | End: 2025-01-31

## 2025-01-31 RX ORDER — FENTANYL CITRATE/PF 50 MCG/ML
25 SYRINGE (ML) INJECTION
Status: DISCONTINUED | OUTPATIENT
Start: 2025-01-31 | End: 2025-01-31 | Stop reason: HOSPADM

## 2025-01-31 RX ORDER — OXYCODONE AND ACETAMINOPHEN 5; 325 MG/1; MG/1
1 TABLET ORAL ONCE AS NEEDED
Status: DISCONTINUED | OUTPATIENT
Start: 2025-01-31 | End: 2025-01-31 | Stop reason: HOSPADM

## 2025-01-31 RX ORDER — DEXAMETHASONE SODIUM PHOSPHATE 10 MG/ML
INJECTION, SOLUTION INTRAMUSCULAR; INTRAVENOUS AS NEEDED
Status: DISCONTINUED | OUTPATIENT
Start: 2025-01-31 | End: 2025-01-31

## 2025-01-31 RX ORDER — ONDANSETRON 2 MG/ML
4 INJECTION INTRAMUSCULAR; INTRAVENOUS ONCE AS NEEDED
Status: DISCONTINUED | OUTPATIENT
Start: 2025-01-31 | End: 2025-01-31 | Stop reason: HOSPADM

## 2025-01-31 RX ORDER — MAGNESIUM HYDROXIDE 1200 MG/15ML
LIQUID ORAL AS NEEDED
Status: DISCONTINUED | OUTPATIENT
Start: 2025-01-31 | End: 2025-01-31 | Stop reason: HOSPADM

## 2025-01-31 RX ORDER — ONDANSETRON 2 MG/ML
INJECTION INTRAMUSCULAR; INTRAVENOUS AS NEEDED
Status: DISCONTINUED | OUTPATIENT
Start: 2025-01-31 | End: 2025-01-31

## 2025-01-31 RX ORDER — CHLORHEXIDINE GLUCONATE ORAL RINSE 1.2 MG/ML
15 SOLUTION DENTAL ONCE
Status: COMPLETED | OUTPATIENT
Start: 2025-01-31 | End: 2025-01-31

## 2025-01-31 RX ORDER — PROMETHAZINE HYDROCHLORIDE 25 MG/ML
25 INJECTION, SOLUTION INTRAMUSCULAR; INTRAVENOUS ONCE AS NEEDED
Status: DISCONTINUED | OUTPATIENT
Start: 2025-01-31 | End: 2025-01-31 | Stop reason: HOSPADM

## 2025-01-31 RX ORDER — LIDOCAINE HYDROCHLORIDE 10 MG/ML
INJECTION, SOLUTION EPIDURAL; INFILTRATION; INTRACAUDAL; PERINEURAL AS NEEDED
Status: DISCONTINUED | OUTPATIENT
Start: 2025-01-31 | End: 2025-01-31

## 2025-01-31 RX ORDER — KETOROLAC TROMETHAMINE 30 MG/ML
INJECTION, SOLUTION INTRAMUSCULAR; INTRAVENOUS AS NEEDED
Status: DISCONTINUED | OUTPATIENT
Start: 2025-01-31 | End: 2025-01-31

## 2025-01-31 RX ORDER — FENTANYL CITRATE 50 UG/ML
INJECTION, SOLUTION INTRAMUSCULAR; INTRAVENOUS AS NEEDED
Status: DISCONTINUED | OUTPATIENT
Start: 2025-01-31 | End: 2025-01-31

## 2025-01-31 RX ADMIN — LIDOCAINE HYDROCHLORIDE 50 MG: 10 INJECTION, SOLUTION EPIDURAL; INFILTRATION; INTRACAUDAL; PERINEURAL at 08:50

## 2025-01-31 RX ADMIN — MIDAZOLAM 2 MG: 1 INJECTION INTRAMUSCULAR; INTRAVENOUS at 08:46

## 2025-01-31 RX ADMIN — FENTANYL CITRATE 50 MCG: 50 INJECTION, SOLUTION INTRAMUSCULAR; INTRAVENOUS at 09:37

## 2025-01-31 RX ADMIN — CHLORHEXIDINE GLUCONATE 0.12% ORAL RINSE 15 ML: 1.2 LIQUID ORAL at 07:24

## 2025-01-31 RX ADMIN — ONDANSETRON 4 MG: 2 INJECTION INTRAMUSCULAR; INTRAVENOUS at 08:54

## 2025-01-31 RX ADMIN — KETOROLAC TROMETHAMINE 30 MG: 30 INJECTION, SOLUTION INTRAMUSCULAR at 08:58

## 2025-01-31 RX ADMIN — SODIUM CHLORIDE, SODIUM LACTATE, POTASSIUM CHLORIDE, AND CALCIUM CHLORIDE: .6; .31; .03; .02 INJECTION, SOLUTION INTRAVENOUS at 08:47

## 2025-01-31 RX ADMIN — FENTANYL CITRATE 50 MCG: 50 INJECTION, SOLUTION INTRAMUSCULAR; INTRAVENOUS at 08:53

## 2025-01-31 RX ADMIN — DEXAMETHASONE SODIUM PHOSPHATE 10 MG: 10 INJECTION, SOLUTION INTRAMUSCULAR; INTRAVENOUS at 08:54

## 2025-01-31 RX ADMIN — PROPOFOL 200 MG: 10 INJECTION, EMULSION INTRAVENOUS at 08:50

## 2025-01-31 RX ADMIN — CEFAZOLIN SODIUM 2000 MG: 2 SOLUTION INTRAVENOUS at 08:47

## 2025-01-31 NOTE — H&P
History and Physical reviewed from 1/24/2025. There are no changes to the H&P. Vital signs stable.     Cardiovascular: NSR   Pulmonary: Non-labored respirations

## 2025-01-31 NOTE — ANESTHESIA PREPROCEDURE EVALUATION
Procedure:  CHEILECTOMY WITHOUT IMPLANT (Right: Foot)    Relevant Problems   No relevant active problems        Physical Exam    Airway    Mallampati score: I  TM Distance: >3 FB  Neck ROM: full     Dental   No notable dental hx     Cardiovascular  Rhythm: regular, Rate: normal, Cardiovascular exam normal    Pulmonary  Pulmonary exam normal Breath sounds clear to auscultation    Other Findings  post-pubertal.      Anesthesia Plan  ASA Score- 1     Anesthesia Type- general with ASA Monitors.         Additional Monitors:     Airway Plan: LMA.           Plan Factors-Exercise tolerance (METS): >4 METS.    Chart reviewed. EKG reviewed. Imaging results reviewed. Existing labs reviewed. Patient summary reviewed.    Patient is not a current smoker.  Patient did not smoke on day of surgery.            Induction- intravenous.    Postoperative Plan- Plan for postoperative opioid use. Planned trial extubation    Perioperative Resuscitation Plan - Level 1 - Full Code.       Informed Consent- Anesthetic plan and risks discussed with patient.  I personally reviewed this patient with the CRNA. Discussed and agreed on the Anesthesia Plan with the CRNA..      NPO Status:  No vitals data found for the desired time range.

## 2025-01-31 NOTE — OP NOTE
OPERATIVE REPORT  PATIENT NAME: Heydi Villela    :  1975  MRN: [id number]  Pt Location: [facility] ROOM 04    SURGERY DATE: 2025    Surgeons and Role:     * Mauri Klein DPM - Primary     * Felipe Steele DPM - Assisting    Preop Diagnosis:  Arthralgia of right foot [M25.571]  Acquired deformity of right foot [M21.961]  Hallux rigidus of right foot [M20.21]  Right foot pain [M79.671]    Post-Op Diagnosis Codes:     * Arthralgia of right foot [M25.571]     * Acquired deformity of right foot [M21.961]     * Hallux rigidus of right foot [M20.21]     * Right foot pain [M79.671]    Procedure(s):  Right - CHEILECTOMY WITHOUT IMPLANT    Specimen(s):  * No specimens in log *    Estimated Blood Loss:   Minimal    Drains:  * No LDAs found *    Anesthesia Type:   General    Operative Indications:  Arthralgia of right foot [M25.571]  Acquired deformity of right foot [M21.961]  Hallux rigidus of right foot [M20.21]  Right foot pain [M79.671]  Patient has chronic pain of the right foot.  She has been diagnosed with osteoarthritis of the right first MPJ with secondary hallux rigidus deformity.  She has failed all conservative measures.  She is therefore requested surgery in the hope of alleviating her complaint of pain.  She is consented to surgery understanding all the possible risks benefits alternatives and complications and understanding that no guarantees are being given.  Patient understands and of the following could occur but not limited to pain scar swelling hypo or hyperesthesia.  RSD phlebitis or vasculitis.  Wound and need for wound care.  Infection need for infection care.  Chronic pain.  Need for future surgery.  And this is the name a few.    Operative Findings:  First MPJ demonstrates osteoarthritis with dorsal spurring.  Patient has hallux rigidus deformity of the right foot.      Complications:   None    Procedure and Technique:  Patient was brought to the OR and placed in operative table in supine  position.  She was prepped and draped in the usual sterile manner.  Ankle tourniquet used.  Attention was directed to the first ray where a 15 blade scalpel was used to perform a 4 cm linear incision overlying the dorsal medial aspect of the joint.  The incision was placed medial to the EHL tendon.  Incision was deepened down through subcutaneous layers with all bleeders being bovied and coagulated as necessary.  At this time a linear capsular incision was performed giving exposure to the joint.  There was moderate amount of dorsal spurring noted of the first MPJ of the right foot.  This was on both sides of the joint.  Utilizing oscillating saw and rongeur all abnormal bone was removed from the first metatarsal head as well as the base of the proximal phalanx.  At this time plantar capsular structures were freed with McGlamery elevator.  There was some erosion of the bone noted on both the proximal phalanx base and the first metatarsal head and the dorsal one third aspect of the joint.  At this time subchondral drilling/microfracture was performed.  This does to hopefully facilitate new cartilage growth.  At this time the joint lied in a rectus anatomical position.  With loading of the foot full range of motion was noted of the first MPJ and this was done without crepitus.  So after copiously flushing with large amounts of sterile saline, closure was obtained.  Deep capsular structures were reapproximated with 3-0 Vicryl, subcutaneous tissues with 4-0 chromic and the skin with 4 nylon.  Dry sterile dressing was applied after surgery.  Tourniquet was released and vascular status returned to all digits to preoperative levels.  Patient was transferred to recovery room with vital signs stable neurovascular status intact.  Patient is to be followed in the office only.  Follow written instruction and take prescribed medication.   I was present for the entire procedure.    Patient Disposition:  PACU               SIGNATURE: Mauri Klein DPM  DATE: January 31, 2025  TIME: 10:08 AM

## 2025-01-31 NOTE — INTERIM OP NOTE
CHEILECTOMY WITHOUT IMPLANT  Postoperative Note  PATIENT NAME: Heydi Villela  : 1975  MRN: [id number]  [facility] ROOM 04    Surgery Date: 2025    Preop Diagnosis:  Arthralgia of right foot [M25.571]  Acquired deformity of right foot [M21.961]  Hallux rigidus of right foot [M20.21]  Right foot pain [M79.671]    Post-Op Diagnosis Codes:     * Arthralgia of right foot [M25.571]     * Acquired deformity of right foot [M21.961]     * Hallux rigidus of right foot [M20.21]     * Right foot pain [M79.671]    Procedure(s) (LRB):  CHEILECTOMY WITHOUT IMPLANT (Right)    Surgeons and Role:     * Mauri Klein DPM - Primary     * Felipe Steele DPM - Assisting    Specimens:  * No specimens in log *    Estimated Blood Loss:   Minimal    Anesthesia Type:   General     Findings:   First MPJ right foot demonstrates significant osteoarthritis with dorsal spurring.  Hallux rigidus deformity.  Complications:   None      SIGNATURE: Mauri Klein DPM   DATE: 2025   TIME: 9:53 AM

## 2025-01-31 NOTE — ANESTHESIA POSTPROCEDURE EVALUATION
Post-Op Assessment Note    CV Status:  Stable  Pain Score: 0    Pain management: adequate       Mental Status:  Arousable and sleepy   Hydration Status:  Stable and euvolemic   PONV Controlled:  Controlled   Airway Patency:  Patent and adequate     Post Op Vitals Reviewed: Yes      Staff: CRNA           Last Filed PACU Vitals:  Vitals Value Taken Time   Temp 97.2 °F (36.2 °C) 01/31/25 1000   Pulse 71 01/31/25 1030   /65 01/31/25 1030   Resp 18 01/31/25 1030   SpO2 99 % 01/31/25 1030       Modified Jethro:     Vitals Value Taken Time   Activity 2 01/31/25 1025   Respiration 2 01/31/25 1025   Circulation 2 01/31/25 1025   Consciousness 2 01/31/25 1025   Oxygen Saturation 2 01/31/25 1025     Modified Jethro Score: 10

## 2025-01-31 NOTE — PERIOPERATIVE NURSING NOTE
Received from [facility]. Awake,alert. VSS Call bell in reach, rails up, spouse at bedside. Right foot ace dsg dry, intact.

## 2025-01-31 NOTE — DISCHARGE INSTR - AVS FIRST PAGE
PODIATRY DISCHARGE INSTRUCTIONS  DR. KIAN KLEIN    General Instructions    No driving or operating machinery for 24 hours or while taking pain medication.  No alcoholic beverages for 24 hours or while taking pain medication.  DO NOT make any important personal or business decisions for 24 hours.    Diet:  Begin with liquids and light food and progress to your normal diet unless you are nauseated or otherwise instructed by your physician.    Medication:  Begin taking pain medication as directed and remember that narcotic medications may be constipating.  Consider starting over the counter stool softeners.  If constipation persists begin taking over the counter laxative.    Dressing:  Keep dressing dry. Do not remove dressing until seen by Dr Klein    Ice:  Apply ice to affected area 20 minutes on and 20 minutes off unless otherwise         Instructed     Elevation:  Keep affected foot elevated on pillows    Weight Bearing status:  _____________________ okay to walk and weight-bear when out of bed but must have on surgical shoe.  ______  Wear blue surgical shoe when walking  Use crutches, cane or walker as directed    Keep regular scheduled appointment with Dr Klein

## 2025-01-31 NOTE — PROGRESS NOTES
Patient alert and awake, dressing clean, dry and intact, vital signs WNL. Patient transferred to APU via stretcher. Bedside report given to JUNITO Zamarripa. Stretcher in lowest position and locked, side rails up, call bell within reach.

## 2025-01-31 NOTE — ANESTHESIA POSTPROCEDURE EVALUATION
Post-Op Assessment Note    CV Status:  Stable  Pain Score: 0    Pain management: adequate       Mental Status:  Alert   Hydration Status:  Stable   PONV Controlled:  None   Airway Patency:  Patent     Post Op Vitals Reviewed: Yes    No anethesia notable event occurred.    Staff: Anesthesiologist           Last Filed PACU Vitals:  Vitals Value Taken Time   Temp     Pulse     BP     Resp     SpO2

## 2025-02-07 ENCOUNTER — OFFICE VISIT (OUTPATIENT)
Age: 50
End: 2025-02-07

## 2025-02-07 VITALS — BODY MASS INDEX: 27.6 KG/M2 | HEART RATE: 77 BPM | RESPIRATION RATE: 18 BRPM | WEIGHT: 150 LBS | HEIGHT: 62 IN

## 2025-02-07 DIAGNOSIS — M79.671 RIGHT FOOT PAIN: Primary | ICD-10-CM

## 2025-02-07 PROCEDURE — 99024 POSTOP FOLLOW-UP VISIT: CPT | Performed by: PODIATRIST

## 2025-02-07 RX ORDER — MELOXICAM 7.5 MG/1
7.5 TABLET ORAL DAILY
Qty: 20 TABLET | Refills: 0 | Status: SHIPPED | OUTPATIENT
Start: 2025-02-07 | End: 2025-02-27

## 2025-02-07 NOTE — PROGRESS NOTES
Patient is status post right foot surgery.  She is doing well.  No history of fever or night sweats.    Objective.  Dry sterile dressing dry.  Removed.  Incision coapted.  Hallux rectus.  No evidence of infection.    Plan.  Change of dry sterile dressing done today.  Patient can get foot wet in 48 hours.  She will bandage daily.  We will add Mobic.  X-rays ordered.  Return for follow-up.  Remain in surgical shoe

## 2025-02-24 ENCOUNTER — OFFICE VISIT (OUTPATIENT)
Age: 50
End: 2025-02-24

## 2025-02-24 VITALS — BODY MASS INDEX: 27.6 KG/M2 | WEIGHT: 150 LBS | HEIGHT: 62 IN | RESPIRATION RATE: 16 BRPM

## 2025-02-24 DIAGNOSIS — M79.671 RIGHT FOOT PAIN: Primary | ICD-10-CM

## 2025-02-24 PROCEDURE — 99024 POSTOP FOLLOW-UP VISIT: CPT | Performed by: PODIATRIST

## 2025-02-24 NOTE — PROGRESS NOTES
Patient is doing well.  She has minimal pain of the operative foot.  She does feel that the toe is slightly stiff however.    Objective.  Incision right first MPJ coapted.  No evidence of infection.  Patient has crepitus free range of motion.    Plan.  Sutures removed.  Patient can now transition to street shoe.  Physical therapy pending outcome.

## (undated) DEVICE — STERILE DOUBLE BASIN SET PACK: Brand: CARDINAL HEALTH

## (undated) DEVICE — ACE WRAP 4 IN UNSTERILE

## (undated) DEVICE — ASTOUND STANDARD SURGICAL GOWN, XL: Brand: CONVERTORS

## (undated) DEVICE — HALF SHEET: Brand: CONVERTORS

## (undated) DEVICE — GLOVE SRG BIOGEL 7.5

## (undated) DEVICE — IMPLANTABLE DEVICE
Type: IMPLANTABLE DEVICE | Status: NON-FUNCTIONAL
Removed: 2025-01-31

## (undated) DEVICE — NEPTUNE E-SEP SMOKE EVACUATION PENCIL, COATED, 70MM BLADE, PUSH BUTTON SWITCH: Brand: NEPTUNE E-SEP

## (undated) DEVICE — STOCKINETTE,IMPERVIOUS,12X48,STERILE: Brand: MEDLINE

## (undated) DEVICE — KERLIX BANDAGE ROLL: Brand: KERLIX

## (undated) DEVICE — CUFF TOURNIQUET 18 X 4 IN QUICK CONNECT DISP 1 BLADDER

## (undated) DEVICE — PACK GENERAL LF

## (undated) DEVICE — PAD CAST 4 IN COTTON NON STERILE

## (undated) DEVICE — INTENDED FOR TISSUE SEPARATION, AND OTHER PROCEDURES THAT REQUIRE A SHARP SURGICAL BLADE TO PUNCTURE OR CUT.: Brand: BARD-PARKER ® CARBON RIB-BACK BLADES

## (undated) DEVICE — ANTIBACTERIAL UNDYED BRAIDED (POLYGLACTIN 910), SYNTHETIC ABSORBABLE SUTURE: Brand: COATED VICRYL

## (undated) DEVICE — SPONGE GAUZE 4 X 9

## (undated) DEVICE — SUT ETHILON 3-0 FS-1 18 IN 663G

## (undated) DEVICE — SUT CHROMIC 4-0 SH-1 27 IN G181H

## (undated) DEVICE — SHOE, POST-OPERATIVE: Brand: DEROYAL

## (undated) DEVICE — GLOVE INDICATOR PI UNDERGLOVE SZ 8 BLUE

## (undated) DEVICE — PRECISION (9.0 X 0.51 X 18.5MM)

## (undated) DEVICE — CURITY NON-ADHERENT STRIPS: Brand: CURITY

## (undated) DEVICE — STRETCH BANDAGE: Brand: CURITY

## (undated) DEVICE — CHLORAPREP HI-LITE 26ML ORANGE

## (undated) DEVICE — TIBURON EXTREMITY SHEET: Brand: CONVERTORS